# Patient Record
Sex: FEMALE | Race: BLACK OR AFRICAN AMERICAN | Employment: UNEMPLOYED | URBAN - METROPOLITAN AREA
[De-identification: names, ages, dates, MRNs, and addresses within clinical notes are randomized per-mention and may not be internally consistent; named-entity substitution may affect disease eponyms.]

---

## 2017-01-10 NOTE — PROGRESS NOTES
SUBJECTIVE:                                                    Mary Valentine is a 21 year old female who presents to clinic today for the following health issues:      Anxiety Follow-Up  Lexapro 10mg qd    Status since last visit: Worsened, states that she is worried something will happen but unsure of what    Other associated symptoms:Not able to sleep and stomach aches    Complicating factors:   Significant life event: No   Current substance abuse: None  Depression symptoms: No  WILMAR-7 SCORE 7/27/2016 8/17/2016 9/9/2016   Total Score 19 15 19        GAD7       Medication Follow-up of ADD  Vyvanse 40mg qd - She states that she was supposed to be seen at [facility] but is finding it hard to set up appointment    Taking Medication as prescribed: yes    Side Effects:  None    Medication Helping Symptoms:  Yes helps her stay orgainzed                                                                                                       Some-                                                                        Never   Rarely      times       Often  Very Often  1. How often do you have trouble wrapping up the final details of a project,  once the challenging parts have been done?    x    2. How often do you have difficulty getting things in order when you have to do a task that requires organization?    x    3. How often do you have problems remembering appointments or obligations?    x    4. When you have a task that requires a lot of thought, how often do you avoid or delay getting started?    x    5. How often do you fidget or squirm with your hands or feet when you have to sit down for a long time?    x    6. How often do you feel overly active and compelled to do things, like you were driven by a motor?    x      Part A                                                        7. How often do you make careless mistakes when you have to work on a boring or difficult project?    x    8. How often do you have  difficulty keeping your attention when you are doing boring or repetitive work?    x    9. How often do you have difficulty concentrating on what people say to you, even when they are speaking to you directly?    x    10. How often do you misplace or have difficulty finding things at home or at work?    x    11. How often are you distracted by activity or noise around you?    x    12. How often do you leave your seat in meetings or other situations in which you are expected to remain seated?      x    13. How often do you feel restless or fidgety?      x    14. How often do you have difficulty unwinding and relaxing when you have time to yourself?    x    15. How often do you find yourself talking too much when you are in social situations?    x    16. When you're in a conversation, how often do you find yourself finishing the sentences of the people you are talking to, before they can finish them themselves?    x    17. How often do you have difficulty waiting your turn in situations when turn-taking is required?    x    18. How often do you interrupt others when they are busy?    x        Asthma Follow-Up  Dulera 200-5mcg/ACT 2 puffs BID, Albuterol 108mcg/ACT 2 puffs q6h PRN, albuterol 0.083% neb solution q6h PRN, prednisone 40mg qd PRN  - Was ACT completed today?    Yes    ACT Total Scores 9/9/2016   ACT TOTAL SCORE (Goal Greater than or Equal to 20) 13   In the past 12 months, how many times did you visit the emergency room for your asthma without being admitted to the hospital? 2   In the past 12 months, how many times were you hospitalized overnight because of your asthma? 2       - Discuss birth control options. She has never used BC in the past.    - Requesting rapid today. She states that room mates children dx with strep. She is having no sore throat now. No other cold sx. She does have PMHx tonsillectomy 2016.    - Patient says she has not slept in 6 days. She gets anxious and can't calm down. She is not  taking any medications at this time. She has not taken anything since September. She says she is continually fighting impulsive activities. Patient has tried taking melatonin, states it does not work.       Amount of exercise or physical activity: 2-3 days/week for an average of 15-30 minutes    Problems taking medications regularly: No    Medication side effects: none    Diet: regular (no restrictions)        ROS:  Constitutional, HEENT, cardiovascular, pulmonary, gi and gu systems are negative, except as otherwise noted.      This document serves as a record of the services and decisions personally performed and made by Anjana Dubois MD. It was created on his behalf by Garrett Santoro, a trained medical scribe. The creation of this document is based the provider's statements to the medical scribe.  Garrett Santoro 2:26 PM January 11, 2017      OBJECTIVE:                                                    There were no vitals taken for this visit.  There is no weight on file to calculate BMI.     GENERAL: healthy, alert and mild distress  EYES: Eyes grossly normal to inspection, conjunctivae and sclerae normal  MS: no gross musculoskeletal defects noted, no edema  SKIN: no suspicious lesions or rashes      Rapid Strep Screen      Component     Specimen Description     Throat     Rapid Strep A Screen     NEGATIVE: No Group A streptococcal antigen detected by immunoassay, await    culture report.        Micro Report Status     FINAL 01/11/2017          ASSESSMENT/PLAN:                                                      (J02.9) Viral pharyngitis  (primary encounter diagnosis)  Comment: Rapid was negative. Awaiting beta strep group.   Plan: Strep, Rapid Screen, Beta strep group A culture    (F98.8) ADD (attention deficit disorder)  Comment: Patient is not sleeping at this time. I will prescribe low dose stimulant therapy to help symptoms. At this time, I am not sure that stimulant therapy would be best. Will  re-evaluate medication after psychiatrist appointment.   Plan: lisdexamfetamine (VYVANSE) 20 MG capsule    (F41.1) Generalized anxiety disorder  Comment: Patient has not taken medication for anxiety since September. Anxiety is uncontrolled at this time. I advised patient to go to psychiatrist for further evaluation. I prescribed medication to help with sleeping. She is presenting a very complex psychiatric manner. Unclear for stimulant medication makes her anxiety worse, or first 2 medication columns are. Strongly encouraged her to follow-up with psychiatry. This seems above my expertise. We'll restart Seroquel and refer.  Plan: QUEtiapine (SEROQUEL) 100 MG tablet, MENTAL         HEALTH REFERRAL      (J45.40) Moderate persistent asthma without complication  Comment: No change. Asthma symptoms stable.   Plan: mometasone-formoterol (DULERA) 200-5 MCG/ACT         oral inhaler, montelukast (SINGULAIR) 10 MG         tablet, albuterol (2.5 MG/3ML) 0.083% neb         solution, albuterol (PROAIR HFA/PROVENTIL         HFA/VENTOLIN HFA) 108 (90 BASE) MCG/ACT Inhaler    Patient will follow up in 1 week or sooner, PRN. Patient instructed to call with any questions or concerns.    Patient Instructions   *   Try to find a medication to help with sleep. Seroquel.     *   Try to see psychiatry. call [facility]: [facility] - [facility]  [phone]   [email or web address]    * Follow up in one week.     *    Try a low dose of Vyvanse.       The information in this document, created by a scribe for me, accurately reflects the services I personally performed and the decisions made by me. I have reviewed and approved this document for accuracy. 2:37 PM1/11/2017      Anjana Dubois MD  [facility]

## 2017-01-11 ENCOUNTER — OFFICE VISIT (OUTPATIENT)
Dept: FAMILY MEDICINE | Facility: CLINIC | Age: 22
End: 2017-01-11
Payer: COMMERCIAL

## 2017-01-11 VITALS
BODY MASS INDEX: 34.84 KG/M2 | TEMPERATURE: 97.7 F | SYSTOLIC BLOOD PRESSURE: 126 MMHG | HEIGHT: 67 IN | WEIGHT: 222 LBS | DIASTOLIC BLOOD PRESSURE: 70 MMHG | HEART RATE: 76 BPM

## 2017-01-11 DIAGNOSIS — F98.8 ADD (ATTENTION DEFICIT DISORDER): ICD-10-CM

## 2017-01-11 DIAGNOSIS — F41.1 GENERALIZED ANXIETY DISORDER: ICD-10-CM

## 2017-01-11 DIAGNOSIS — J45.40 MODERATE PERSISTENT ASTHMA WITHOUT COMPLICATION: ICD-10-CM

## 2017-01-11 DIAGNOSIS — J02.9 VIRAL PHARYNGITIS: Primary | ICD-10-CM

## 2017-01-11 LAB
DEPRECATED S PYO AG THROAT QL EIA: NORMAL
MICRO REPORT STATUS: NORMAL
SPECIMEN SOURCE: NORMAL

## 2017-01-11 PROCEDURE — 87880 STREP A ASSAY W/OPTIC: CPT | Performed by: FAMILY MEDICINE

## 2017-01-11 PROCEDURE — 87081 CULTURE SCREEN ONLY: CPT | Performed by: FAMILY MEDICINE

## 2017-01-11 PROCEDURE — 99214 OFFICE O/P EST MOD 30 MIN: CPT | Performed by: FAMILY MEDICINE

## 2017-01-11 RX ORDER — ESCITALOPRAM OXALATE 10 MG/1
10 TABLET ORAL DAILY
Qty: 90 TABLET | Refills: 1 | Status: CANCELLED | OUTPATIENT
Start: 2017-01-11

## 2017-01-11 RX ORDER — MONTELUKAST SODIUM 10 MG/1
10 TABLET ORAL AT BEDTIME
Qty: 30 TABLET | Refills: 3 | Status: SHIPPED | OUTPATIENT
Start: 2017-01-11

## 2017-01-11 RX ORDER — LISDEXAMFETAMINE DIMESYLATE 40 MG/1
40 CAPSULE ORAL EVERY MORNING
Qty: 30 CAPSULE | Refills: 0 | Status: CANCELLED | OUTPATIENT
Start: 2017-01-11

## 2017-01-11 RX ORDER — ALBUTEROL SULFATE 0.83 MG/ML
1 SOLUTION RESPIRATORY (INHALATION) EVERY 6 HOURS PRN
Qty: 25 VIAL | Refills: 11 | Status: SHIPPED | OUTPATIENT
Start: 2017-01-11

## 2017-01-11 RX ORDER — LISDEXAMFETAMINE DIMESYLATE 20 MG/1
20 CAPSULE ORAL EVERY MORNING
Qty: 30 CAPSULE | Refills: 0 | Status: SHIPPED | OUTPATIENT
Start: 2017-01-11

## 2017-01-11 RX ORDER — PREDNISONE 20 MG/1
40 TABLET ORAL DAILY
Qty: 10 TABLET | Refills: 0 | Status: CANCELLED | OUTPATIENT
Start: 2017-01-11

## 2017-01-11 RX ORDER — QUETIAPINE FUMARATE 100 MG/1
100 TABLET, FILM COATED ORAL AT BEDTIME
Qty: 30 TABLET | Refills: 1 | Status: SHIPPED | OUTPATIENT
Start: 2017-01-11

## 2017-01-11 RX ORDER — ALBUTEROL SULFATE 90 UG/1
2 AEROSOL, METERED RESPIRATORY (INHALATION) EVERY 6 HOURS PRN
Qty: 1 INHALER | Refills: 11 | Status: SHIPPED | OUTPATIENT
Start: 2017-01-11 | End: 2018-07-13

## 2017-01-11 ASSESSMENT — ANXIETY QUESTIONNAIRES
1. FEELING NERVOUS, ANXIOUS, OR ON EDGE: SEVERAL DAYS
2. NOT BEING ABLE TO STOP OR CONTROL WORRYING: SEVERAL DAYS
GAD7 TOTAL SCORE: 7
6. BECOMING EASILY ANNOYED OR IRRITABLE: SEVERAL DAYS
5. BEING SO RESTLESS THAT IT IS HARD TO SIT STILL: SEVERAL DAYS
3. WORRYING TOO MUCH ABOUT DIFFERENT THINGS: SEVERAL DAYS
7. FEELING AFRAID AS IF SOMETHING AWFUL MIGHT HAPPEN: SEVERAL DAYS

## 2017-01-11 ASSESSMENT — PATIENT HEALTH QUESTIONNAIRE - PHQ9: 5. POOR APPETITE OR OVEREATING: SEVERAL DAYS

## 2017-01-11 NOTE — Clinical Note
[facility]  [address] [facility] [address]  Phone: [phone]    January 13, 2017    Mary Valentine  [address] [place] [id number]              Dear Ms. Valentine,      The results of your recent throat culture were negative. If you have any further questions or concerns please contact the clinic.            Sincerely,      Anjana Dubois MD

## 2017-01-11 NOTE — PATIENT INSTRUCTIONS
*   Try to find a medication to help with sleep. Seroquel.     *   Try to see psychiatry. call [facility]: [facility] - [facility]  [phone]   [email or web address]    * Follow up in one week.     *    Try a low dose of Vyvanse.

## 2017-01-11 NOTE — NURSING NOTE
"Initial /70 mmHg  Pulse 76  Temp(Src) 97.7  F (36.5  C) (Tympanic)  Ht 5' 7.25\" (1.708 m)  Wt 222 lb (100.699 kg)  BMI 34.52 kg/m2 Estimated body mass index is 34.52 kg/(m^2) as calculated from the following:    Height as of this encounter: 5' 7.25\" (1.708 m).    Weight as of this encounter: 222 lb (100.699 kg). .        "

## 2017-01-11 NOTE — MR AVS SNAPSHOT
After Visit Summary   1/11/2017    Mary Valentine    MRN: [id number]           Patient Information     Date Of Birth          1995        Visit Information        Provider Department      1/11/2017 1:40 PM Anjana Dubois MD [facility]        Today's Diagnoses     Viral pharyngitis    -  1     Moderate persistent asthma without complication         ADD (attention deficit disorder)         Generalized anxiety disorder           Care Instructions    *   Try to find a medication to help with sleep. Seroquel.     *   Try to see psychiatry. call [facility]: [facility] - [facility]  [phone]   [email or web address]-    * Follow up in one week.     *    Try a low dose of Vyvanse.         Follow-ups after your visit        Additional Services     MENTAL HEALTH REFERRAL       Your provider has referred you to: [facility]: [facility]  [phone]   [email or web address]   *Referral from [facility] Primary Care Provider required - Consultation Model - medication management & future refills will be returned to [facility] PCP upon completion of evaluation  *Please call to schedule an appointment.    All scheduling is subject to the client's specific insurance plan & benefits, provider/location availability, and provider clinical specialities.  Please arrive 15 minutes early for your first appointment and bring your completed paperwork.    Please be aware that coverage of these services is subject to the terms and limitations of your health insurance plan.  Call member services at your health plan with any benefit or coverage questions.                  Who to contact     Normal or non-critical lab and imaging results will be communicated to you by MyChart, letter or phone within 4 business days after the clinic  "has received the results. If you do not hear from us within 7 days, please contact the clinic through MyClasses or phone. If you have a critical or abnormal lab result, we will notify you by phone as soon as possible.  Submit refill requests through MyClasses or call your pharmacy and they will forward the refill request to us. Please allow 3 business days for your refill to be completed.          If you need to speak with a  for additional information , please call: [phone]           Additional Information About Your Visit        MyClasses Information     MyClasses lets you send messages to your doctor, view your test results, renew your prescriptions, schedule appointments and more. To sign up, go to [email or web address] . Click on \"Log in\" on the left side of the screen, which will take you to the Welcome page. Then click on \"Sign up Now\" on the right side of the page.     You will be asked to enter the access code listed below, as well as some personal information. Please follow the directions to create your username and password.     Your access code is: [email or web address]  Expires: 2017  2:32 PM     Your access code will  in 90 days. If you need help or a new code, please call your [place] clinic or [phone].        Care EveryWhere ID     This is your Care EveryWhere ID. This could be used by other organizations to access your [place] medical records  [id number]        Your Vitals Were     Pulse Temperature Height BMI (Body Mass Index)          76 97.7  F (36.5  C) (Tympanic) 5' 7.25\" (1.708 m) 34.52 kg/m2         Blood Pressure from Last 3 Encounters:   17 126/70   16 110/68   16 106/64    Weight from Last 3 Encounters:   17 222 lb (100.699 kg)   16 218 lb 4 oz (98.998 kg)   16 214 lb 8 oz (97.297 kg)              We Performed the Following     Beta strep group A culture     MENTAL HEALTH REFERRAL     Strep, Rapid Screen          Today's " Medication Changes          These changes are accurate as of: 1/11/17  2:35 PM.  If you have any questions, ask your nurse or doctor.               Start taking these medicines.        Dose/Directions    QUEtiapine 100 MG tablet   Commonly known as:  SEROquel   Used for:  Generalized anxiety disorder   Started by:  Anjana Dubois MD        Dose:  100 mg   Take 1 tablet (100 mg) by mouth At Bedtime   Quantity:  30 tablet   Refills:  1         Stop taking these medicines if you haven't already. Please contact your care team if you have questions.     escitalopram 10 MG tablet   Commonly known as:  LEXAPRO   Stopped by:  Anjana Dubois MD           fluticasone 50 MCG/ACT spray   Commonly known as:  FLONASE   Stopped by:  Anjana Dubois MD           lisdexamfetamine 40 MG capsule   Commonly known as:  VYVANSE   Stopped by:  Anjana Dubois MD           predniSONE 20 MG tablet   Commonly known as:  DELTASONE   Stopped by:  Anjana Dubois MD                Where to get your medicines      These medications were sent to [facility] [address] [place] [address]  [address] [facility] [address]     Phone:  [phone]    - albuterol (2.5 MG/3ML) 0.083% neb solution  - albuterol 108 (90 BASE) MCG/ACT Inhaler  - mometasone-formoterol 200-5 MCG/ACT oral inhaler  - montelukast 10 MG tablet  - QUEtiapine 100 MG tablet             Primary Care Provider Office Phone #    [facility] [phone]       No address on file        Thank you!     Thank you for choosing [facility]  for your care. Our goal is always to provide you with excellent care. Hearing back from our patients is one way we can continue to improve our services. Please take a few minutes to complete the written survey that you may receive in the mail after your visit with us. Thank you!             Your Updated Medication List - Protect others around you: Learn how to safely use,  store and throw away your medicines at www.disposemymeds.org.          This list is accurate as of: 1/11/17  2:35 PM.  Always use your most recent med list.                   Brand Name Dispense Instructions for use    * albuterol (2.5 MG/3ML) 0.083% neb solution     25 vial    Take 1 vial (2.5 mg) by nebulization every 6 hours as needed for shortness of breath / dyspnea or wheezing       * albuterol 108 (90 BASE) MCG/ACT Inhaler    PROAIR HFA/PROVENTIL HFA/VENTOLIN HFA    1 Inhaler    Inhale 2 puffs into the lungs every 6 hours as needed for shortness of breath / dyspnea or wheezing       cetirizine 10 MG tablet    zyrTEC    30 tablet    Take 1 tablet (10 mg) by mouth every evening       mometasone-formoterol 200-5 MCG/ACT oral inhaler    DULERA    3 Inhaler    Inhale 2 puffs into the lungs 2 times daily       montelukast 10 MG tablet    SINGULAIR    30 tablet    Take 1 tablet (10 mg) by mouth At Bedtime       QUEtiapine 100 MG tablet    SEROquel    30 tablet    Take 1 tablet (100 mg) by mouth At Bedtime       * Notice:  This list has 2 medication(s) that are the same as other medications prescribed for you. Read the directions carefully, and ask your doctor or other care provider to review them with you.

## 2017-01-12 ENCOUNTER — TELEPHONE (OUTPATIENT)
Dept: FAMILY MEDICINE | Facility: CLINIC | Age: 22
End: 2017-01-12

## 2017-01-12 ASSESSMENT — ANXIETY QUESTIONNAIRES: GAD7 TOTAL SCORE: 7

## 2017-01-12 ASSESSMENT — PATIENT HEALTH QUESTIONNAIRE - PHQ9: SUM OF ALL RESPONSES TO PHQ QUESTIONS 1-9: 18

## 2017-01-12 ASSESSMENT — ASTHMA QUESTIONNAIRES: ACT_TOTALSCORE: 14

## 2017-01-12 NOTE — TELEPHONE ENCOUNTER
Received PA request for Vyvanse 20mg caps from the [facility]  Pharmacy Rejection Note: 70 Product/Service Not Covered    Sig: Take 1 capsule (20 mg) by mouth every morning  Disp: 30 per 30  BRIANA: no    No previous PA on file for this med.    Dx: ADD (attention deficit disorder) [F98.8]   Rationale: Tx of ADD (attention deficit disorder) [F98.8]     Provided Ins: [employer]  Provided Ins ID: [id number]  Provided Ins Phone # [phone]    Online insurance verified  Member Number [id number]  Carrier Number [id number]  Account Number [id number]  Group Number [id number]    PA submitted to [employer] via [place], [place] MB26UY    Claude Hung RT (r)  [facility]

## 2017-01-13 LAB
BACTERIA SPEC CULT: NORMAL
MICRO REPORT STATUS: NORMAL
SPECIMEN SOURCE: NORMAL

## 2017-01-18 PROBLEM — F98.8 ADD (ATTENTION DEFICIT DISORDER): Status: ACTIVE | Noted: 2017-01-18

## 2017-01-31 NOTE — TELEPHONE ENCOUNTER
Follow up call to [employer] [phone]            Response provided to the pharmacy, sent to be scanned, routed to the provider              Claude lacey)  [facility]

## 2017-05-01 DIAGNOSIS — J30.1 ALLERGIC RHINITIS DUE TO POLLEN: ICD-10-CM

## 2017-05-01 NOTE — TELEPHONE ENCOUNTER
Cetirizine 10mg      Last Written Prescription Date: 07/13/2016  #30 x 3  Last filled 11/21/2016  Last Office Visit with [facility], [facility] or [facility] prescribing provider: 01/11/2017 ESTEBAN Dubois

## 2017-05-03 RX ORDER — CETIRIZINE HYDROCHLORIDE 10 MG/1
TABLET ORAL
Qty: 30 TABLET | Refills: 5 | Status: SHIPPED | OUTPATIENT
Start: 2017-05-03

## 2017-05-03 NOTE — TELEPHONE ENCOUNTER
Prescription approved per [facility] Refill Protocol or patient Primary care provider (PCP)  YOLI Quintero RN/Ramon Blair

## 2017-06-07 ENCOUNTER — TRANSFERRED RECORDS (OUTPATIENT)
Dept: HEALTH INFORMATION MANAGEMENT | Facility: CLINIC | Age: 22
End: 2017-06-07

## 2017-06-12 ENCOUNTER — TELEPHONE (OUTPATIENT)
Dept: FAMILY MEDICINE | Facility: CLINIC | Age: 22
End: 2017-06-12

## 2017-06-12 DIAGNOSIS — J45.20 MILD INTERMITTENT ASTHMA WITHOUT COMPLICATION: Primary | ICD-10-CM

## 2017-06-12 DIAGNOSIS — J30.1 ALLERGIC RHINITIS DUE TO POLLEN: ICD-10-CM

## 2017-06-12 NOTE — TELEPHONE ENCOUNTER
Pt called requesting nebulizer and parts that go with it.  Pt uses [facility]. Please triage    Martha Hackett, Station

## 2017-06-12 NOTE — TELEPHONE ENCOUNTER
Called pt and informed that order was placed and sent to  pharmacy.    Martha Hackett, [place]

## 2017-06-12 NOTE — TELEPHONE ENCOUNTER
Order placed for nebulizer. It may not be covered by her insurance unless it is through a visit.  Brittnee Howard RN

## 2017-06-13 RX ORDER — CETIRIZINE HYDROCHLORIDE 10 MG/1
TABLET ORAL
Qty: 30 TABLET | Refills: 2 | Status: SHIPPED | OUTPATIENT
Start: 2017-06-13

## 2017-06-13 NOTE — TELEPHONE ENCOUNTER
Cetirizine 10mg      Last Written Prescription Date: 05/03/2017  #30 x 5  Last filled 07/13/2016  Last Office Visit with [facility], [facility] or [facility] prescribing provider: 01/11/2017 ESTEBAN Dubois

## 2017-06-13 NOTE — TELEPHONE ENCOUNTER
Prescription approved per [facility] Refill Protocol.  Tracy Marvin RN

## 2017-07-01 ENCOUNTER — TRANSFERRED RECORDS (OUTPATIENT)
Dept: HEALTH INFORMATION MANAGEMENT | Facility: CLINIC | Age: 22
End: 2017-07-01

## 2017-07-01 LAB — PAP SMEAR - HIM PATIENT REPORTED: NEGATIVE

## 2017-11-28 ENCOUNTER — TRANSFERRED RECORDS (OUTPATIENT)
Dept: HEALTH INFORMATION MANAGEMENT | Facility: CLINIC | Age: 22
End: 2017-11-28

## 2017-11-30 ENCOUNTER — TRANSFERRED RECORDS (OUTPATIENT)
Dept: HEALTH INFORMATION MANAGEMENT | Facility: CLINIC | Age: 22
End: 2017-11-30

## 2018-07-13 DIAGNOSIS — J45.40 MODERATE PERSISTENT ASTHMA WITHOUT COMPLICATION: ICD-10-CM

## 2018-07-13 NOTE — TELEPHONE ENCOUNTER
"Ventolin  Requested Prescriptions   Pending Prescriptions Disp Refills     VENTOLIN  (90 Base) MCG/ACT Inhaler [Pharmacy Med Name: VENTOLIN HFA INH W/DOS CTR 200PUFFS] 18 g 0     Sig: INHALE 2 PUFFS BY MOUTH EVERY 6 HOURS AS NEEDED FOR SHORTNESS OF BREATH.    Asthma Maintenance Inhalers - Anticholinergics Failed    7/13/2018  3:49 PM       Failed - Asthma control assessment score within normal limits in last 6 months    Please review ACT score.          Failed - Recent (6 mo) or future (30 days) visit within the authorizing provider's specialty    Patient had office visit in the last 6 months or has a visit in the next 30 days with authorizing provider or within the authorizing provider's specialty.  See \"Patient Info\" tab in inbasket, or \"Choose Columns\" in Meds & Orders section of the refill encounter.           Passed - Patient is age 12 years or older        Last Written Prescription Date:  1/11/2017  Last Fill Quantity: 1,  # refills: 11   Last office visit: 1/11/2017 with prescribing provider:  TAMIKO   Future Office Visit:      "

## 2018-07-13 NOTE — TELEPHONE ENCOUNTER
I have attempted to contact this patient on cell phone with the following results: 'The number you are trying to call has been disconnected'    I have attempted to contact this patient on home phone with the following results: 'The subscriber you have dialed is not in service'    Routing refill request to provider for review/approval because:  Patient needs to be seen because it has been more than 1 year since last office visit.  ACT not current    Deja GARSIA RN

## 2018-07-16 RX ORDER — ALBUTEROL SULFATE 90 UG/1
AEROSOL, METERED RESPIRATORY (INHALATION)
Qty: 18 G | Refills: 0 | Status: SHIPPED | OUTPATIENT
Start: 2018-07-16 | End: 2020-03-23

## 2020-02-21 ENCOUNTER — MEDICAL CORRESPONDENCE (OUTPATIENT)
Dept: HEALTH INFORMATION MANAGEMENT | Facility: CLINIC | Age: 25
End: 2020-02-21

## 2020-03-23 ENCOUNTER — VIRTUAL VISIT (OUTPATIENT)
Dept: FAMILY MEDICINE | Facility: CLINIC | Age: 25
End: 2020-03-23

## 2020-03-23 DIAGNOSIS — J45.20 MILD INTERMITTENT ASTHMA WITHOUT COMPLICATION: ICD-10-CM

## 2020-03-23 DIAGNOSIS — J45.40 MODERATE PERSISTENT ASTHMA WITHOUT COMPLICATION: ICD-10-CM

## 2020-03-23 PROCEDURE — 99441 ZZC PHYSICIAN TELEPHONE EVALUATION 5-10 MIN: CPT | Performed by: FAMILY MEDICINE

## 2020-03-23 RX ORDER — ALBUTEROL SULFATE 90 UG/1
AEROSOL, METERED RESPIRATORY (INHALATION)
Qty: 18 G | Refills: 0 | Status: SHIPPED | OUTPATIENT
Start: 2020-03-23 | End: 2020-04-30

## 2020-03-23 NOTE — PROGRESS NOTES
"Mary Valentine is a 24 year old female who is being evaluated via a billable telephone visit.      The patient has been notified of following:     \"This telephone visit will be conducted via a call between you and your physician/provider. We have found that certain health care needs can be provided without the need for a physical exam.  This service lets us provide the care you need with a short phone conversation.  If a prescription is necessary we can send it directly to your pharmacy.  If lab work is needed we can place an order for that and you can then stop by our lab to have the test done at a later time.    If during the course of the call the physician/provider feels a telephone visit is not appropriate, you will not be charged for this service.\"     Mary Valentine complains of    Chief Complaint   Patient presents with     Telephone     asthma       I have reviewed and updated the patient's Past Medical History, Social History, Family History and Medication List.    ALLERGIES  Fruit extracts    Asthma Follow-Up  Dulera 200-5mcg/ACT 2 puffs bid, albuterol 108mcg/ACT 2 puffs q6h prn  Was ACT completed today?    Yes    ACT Total Scores 1/11/2017   ACT TOTAL SCORE (Goal Greater than or Equal to 20) 14   In the past 12 months, how many times did you visit the emergency room for your asthma without being admitted to the hospital? 0   In the past 12 months, how many times were you hospitalized overnight because of your asthma? 0       How many days per week do you miss taking your asthma controller medication?  0    Please describe any recent triggers for your asthma: humidity and cold air    Have you had any Emergency Room Visits, Urgent Care Visits, or Hospital Admissions since your last office visit?  No        Assessment/Plan:  1. Moderate persistent asthma without complication  Patient states she's doing well. Just needs refills on her inhalers and nebulizer tubing and mask.   - albuterol (VENTOLIN HFA) " 108 (90 Base) MCG/ACT inhaler; INHALE 2 PUFFS BY MOUTH EVERY 6 HOURS AS NEEDED FOR SHORTNESS OF BREATH.  Dispense: 18 g; Refill: 0  - mometasone-formoterol (DULERA) 200-5 MCG/ACT inhaler; Inhale 2 puffs into the lungs 2 times daily  Dispense: 3 Inhaler; Refill: 3  - Nebulizer and Supplies Order for DME - ONLY FOR DME    2. Mild intermittent asthma without complication  See above.       Phone call duration:  6 minutes    Anjana Dubois MD

## 2020-03-24 ASSESSMENT — ASTHMA QUESTIONNAIRES: ACT_TOTALSCORE: 12

## 2020-04-28 DIAGNOSIS — J45.40 MODERATE PERSISTENT ASTHMA WITHOUT COMPLICATION: ICD-10-CM

## 2020-04-30 RX ORDER — ALBUTEROL SULFATE 90 UG/1
AEROSOL, METERED RESPIRATORY (INHALATION)
Qty: 18 INHALER | Refills: 0 | Status: SHIPPED | OUTPATIENT
Start: 2020-04-30 | End: 2020-12-14

## 2020-04-30 NOTE — TELEPHONE ENCOUNTER
Prescription approved per [facility] Refill Protocol or patient Primary care provider (PCP) Chart and last OV reviewed   YOLI Quintero RN/Ramon Blair

## 2020-12-10 DIAGNOSIS — J45.40 MODERATE PERSISTENT ASTHMA WITHOUT COMPLICATION: ICD-10-CM

## 2020-12-12 NOTE — TELEPHONE ENCOUNTER
Routing refill request to provider for review/approval because:    Asthma Control Test 3/23/2020   ACT Total Score (Goal Greater than or Equal to 20) 12

## 2020-12-14 RX ORDER — ALBUTEROL SULFATE 90 UG/1
AEROSOL, METERED RESPIRATORY (INHALATION)
Qty: 18 INHALER | Refills: 0 | Status: SHIPPED | OUTPATIENT
Start: 2020-12-14 | End: 2021-02-05

## 2021-02-03 DIAGNOSIS — J45.40 MODERATE PERSISTENT ASTHMA WITHOUT COMPLICATION: ICD-10-CM

## 2021-02-05 NOTE — CONFIDENTIAL NOTE
Routing refill request to provider for review/approval because:  Labs not current:  ACT  Patient needs to be seen because:  [place] was 3/23/20    Med pended with reminder

## 2021-02-07 RX ORDER — ALBUTEROL SULFATE 90 UG/1
AEROSOL, METERED RESPIRATORY (INHALATION)
Qty: 18 G | Refills: 0 | Status: SHIPPED | OUTPATIENT
Start: 2021-02-07 | End: 2021-04-26

## 2021-04-23 DIAGNOSIS — J45.40 MODERATE PERSISTENT ASTHMA WITHOUT COMPLICATION: ICD-10-CM

## 2021-04-26 RX ORDER — ALBUTEROL SULFATE 90 UG/1
AEROSOL, METERED RESPIRATORY (INHALATION)
Qty: 18 G | Refills: 0 | Status: SHIPPED | OUTPATIENT
Start: 2021-04-26 | End: 2021-05-27

## 2021-05-20 DIAGNOSIS — J45.40 MODERATE PERSISTENT ASTHMA WITHOUT COMPLICATION: ICD-10-CM

## 2021-05-23 NOTE — TELEPHONE ENCOUNTER
Patient needs an appointment for further refills.     Please reach out to the patient for an appointment. Once scheduled refill request can be routed to the provider.     Thank you,  Ana Wheatley RN

## 2021-05-25 DIAGNOSIS — J45.40 MODERATE PERSISTENT ASTHMA WITHOUT COMPLICATION: ICD-10-CM

## 2021-05-27 RX ORDER — ALBUTEROL SULFATE 90 UG/1
AEROSOL, METERED RESPIRATORY (INHALATION)
Qty: 18 G | Refills: 0 | Status: SHIPPED | OUTPATIENT
Start: 2021-05-27

## 2021-05-27 RX ORDER — ALBUTEROL SULFATE 90 UG/1
AEROSOL, METERED RESPIRATORY (INHALATION)
Qty: 18 G | Refills: 0 | OUTPATIENT
Start: 2021-05-27

## 2021-05-27 NOTE — TELEPHONE ENCOUNTER
Duplicate. See refill request dated 5/20/21.    Sherie Álvarez RN, BSN  [facility]

## 2021-05-27 NOTE — TELEPHONE ENCOUNTER
Last Written Prescription Date:  4/26/21  Last Fill Quantity: 18g,  # refills: 0   Last office visit: 1/11/2017 with prescribing provider:  Dr. Dubois   VIRTUAL visit 3/23/20 with Dr. Dubois  Future Office Visit: none    Routing refill request to provider for review/approval because:  Labs out of range/not current:  ACT  Patient overdue for OV    Med pended with reminder due for appt. Please advise.    /CMA: please try to contact patient and schedule follow up/yearly appointment.    Sherie Álvarez, RN, BSN  [facility]

## 2021-10-21 ENCOUNTER — TELEPHONE (OUTPATIENT)
Dept: FAMILY MEDICINE | Facility: CLINIC | Age: 26
End: 2021-10-21

## 2021-10-21 NOTE — TELEPHONE ENCOUNTER
Prior Authorization Specialty Medication Request    Medication/Dose: albuterol (PROAIR HFA/PROVENTIL HFA/VENTOLIN HFA) 108 (90 Base) MCG/ACT inhaler  ICD code (if different than what is on RX): [J45.40]   Previously Tried and Failed: NA    Insurance Name: No coverage found  Insurance ID: No coverage found  Insurance Phone Number: No coverage found    Pharmacy Information (if different than what is on RX)  [id number] [facility] OF  [address]  Phone: [phone]

## 2021-10-25 NOTE — TELEPHONE ENCOUNTER
Prior Authorization Approval    Authorization Effective Date: 7/25/2021  Authorization Expiration Date: 10/25/2022  Medication: Albuterol-APPROVED  Approved Dose/Quantity:    Reference #:     Insurance Company: [employer] [place] - Phone [phone] Fax [phone]  Expected CoPay:       CoPay Card Available:      Foundation Assistance Needed:    Which Pharmacy is filling the prescription (Not needed for infusion/clinic administered): [facility] #[id number] [address] AT [facility] [address]  Pharmacy Notified: Yes  Patient Notified: Yes  **Instructed pharmacy to notify patient when script is ready to /ship.**

## 2021-10-25 NOTE — TELEPHONE ENCOUNTER
Central Prior Authorization Team   Phone: [phone]    PA Initiation    Medication: Albuterol  Insurance Company: [employer] [place] - Phone [phone] Fax [phone]  Pharmacy Filling the Rx: [id number] [place] [place] [facility] [address] AT [facility] OF  [address]  Filling Pharmacy Phone: [phone]  Filling Pharmacy Fax: [phone]  Start Date: 10/25/2021